# Patient Record
Sex: FEMALE | Race: WHITE | NOT HISPANIC OR LATINO | URBAN - NONMETROPOLITAN AREA
[De-identification: names, ages, dates, MRNs, and addresses within clinical notes are randomized per-mention and may not be internally consistent; named-entity substitution may affect disease eponyms.]

---

## 2018-05-21 PROBLEM — H25.13: Noted: 2018-05-21

## 2018-05-21 PROBLEM — H52.4: Noted: 2017-05-15

## 2019-05-20 ENCOUNTER — IMPORTED ENCOUNTER (OUTPATIENT)
Dept: URBAN - NONMETROPOLITAN AREA CLINIC 1 | Facility: CLINIC | Age: 54
End: 2019-05-20

## 2019-05-20 PROCEDURE — 92015 DETERMINE REFRACTIVE STATE: CPT

## 2019-05-20 PROCEDURE — 92014 COMPRE OPH EXAM EST PT 1/>: CPT

## 2019-05-20 NOTE — PATIENT DISCUSSION
Presbyopia OUDiscussed refractive status in detail with patientMR done today but do not recommend updating due to cataract progression. Continue to monitor S/P LASIK OU S/P PRK OSContinue follow up with [address] in [address] [facility] OUDiscussed diagnosis with patientReviewed symptoms related to cataract progressionDiscussed various treatment options with patientRecommend refer to Dr. Zhane Vasquez for evaluation. Patient agrees with plan will schedule; 's Notes: MR 5/20/19DFE 5/20/19

## 2021-11-01 ENCOUNTER — IMPORTED ENCOUNTER (OUTPATIENT)
Dept: URBAN - NONMETROPOLITAN AREA CLINIC 1 | Facility: CLINIC | Age: 56
End: 2021-11-01

## 2021-11-01 PROCEDURE — 92014 COMPRE OPH EXAM EST PT 1/>: CPT

## 2021-11-01 PROCEDURE — 92015 DETERMINE REFRACTIVE STATE: CPT

## 2021-11-01 NOTE — PATIENT DISCUSSION
Presbyopia OUDiscussed refractive status in detail with patientNew glasses Rx given today. Continue to monitor S/P LASIK OU S/P PRK OSContinue follow up with [address] in [address] [facility] OUDiscussed diagnosis with patientReviewed symptoms related to cataract progressionDiscussed various treatment options with patient. No treatment required at this time. Continue to monitor.

## 2022-04-10 ASSESSMENT — VISUAL ACUITY
OD_CC: 20/50-2
OS_CC: 20/32+
OS_SC: 20/40-
OD_CC: 20/20-
OS_CC: 20/25 (BLURRY)

## 2022-04-10 ASSESSMENT — TONOMETRY
OD_IOP_MMHG: 13
OS_IOP_MMHG: 12
OD_IOP_MMHG: 15
OS_IOP_MMHG: 15